# Patient Record
Sex: MALE | Race: WHITE | HISPANIC OR LATINO | Employment: FULL TIME | URBAN - METROPOLITAN AREA
[De-identification: names, ages, dates, MRNs, and addresses within clinical notes are randomized per-mention and may not be internally consistent; named-entity substitution may affect disease eponyms.]

---

## 2022-09-22 ENCOUNTER — OFFICE VISIT (OUTPATIENT)
Dept: SURGERY | Facility: CLINIC | Age: 37
End: 2022-09-22

## 2022-09-22 VITALS — HEIGHT: 67 IN | BODY MASS INDEX: 31.86 KG/M2 | WEIGHT: 203 LBS

## 2022-09-22 DIAGNOSIS — L08.9 CHRONIC WOUND INFECTION OF ABDOMEN, SEQUELA: Primary | ICD-10-CM

## 2022-09-22 DIAGNOSIS — S31.109S CHRONIC WOUND INFECTION OF ABDOMEN, SEQUELA: Primary | ICD-10-CM

## 2022-09-22 PROCEDURE — 99203 OFFICE O/P NEW LOW 30 MIN: CPT | Performed by: SURGERY

## 2022-09-25 NOTE — PROGRESS NOTES
CC: Evaluation for abdominal wall hernia     HPI: Patient is a very pleasant 37-year-old male that is here today for evaluation of abdominal wall hernia.  He has history of multiple abdominal operations in [place] that included an open appendectomy that was complicated by small bowel obstruction that required reexploration 10 days after the initial surgery and then reexploration the day after.  4 months [facility].  He was also found to have drainage from the mid aspect of his incision.  He was placed on antibiotics.  He was treated conservatively and eventually discharge home.  He reports that since surgery 10 years ago he has been having intermittent episode drainage from his wound.  He denies any fevers or chills.  He has been having regular bowel movements.  Reports no abdominal pain     PMH: None     PSH:   -Open appendectomy approximately 1994  -Exploratory laparotomy with small bowel resection 1994 (10 days after his initial operation)  -Reexploration with possible small bowel resection 1 day after the bowel resection.    MEDS: None     ALL: No known drug allergies    FH and SH: Family history is noncontributory.  The patient smokes 1 pack of cigarettes a day.  Has been doing that for several years.  Denies alcohol abuse or taking any drugs     ROS:   Constitutional: denies any weight changes, fatigue or weakness.  HEENT: Denies hearing loss and rhinorrhea  Cardiovascular: denies chest pain, palpitations, edemas.  Respiratory: denies cough, sputum, SOB.  Gastrointestinal: denies N&V, abd pain, diarrhea, constipation.  Genitourinary: denies dysuria, frequency.  Endocrine: denies cold intolerance, lethargy and flushing.  Hem: denies excessive bruising and postop bleeding.  Musculoskeletal: denies weakness, joint swelling, pain or stiffness.  Neuro: denies seizures, CVA, paresthesia, or peripheral neuropathy.   Skin: denies change in nevi, rashes, masses.    "  PE:   Vitals:    09/22/22 0831   Weight: 92.1 kg (203 lb)   Height: 170.2 cm (67\")     Body mass index is 31.79 kg/m².   Alert and oriented ×3, no acute distress.  Head is normocephalic and atraumatic.  Neck is supple there is no thyromegaly or lymphadenopathy  Chest is clear bilaterally there is no added sounds  Regular rate and rhythm, no murmurs  Abdomen is soft and nontender, is nondistended, bowel sounds are positive.  There is no rebound or guarding is and there is no peritoneal signs.  There is paramedian incision with a white scar.  At the center of the wound there is an area of skin thinning and a small scab at the area where he claims has intermittent episodes of drainage.  There is no drainage.  There is no signs of infection.  There is slightly asymmetry with the left side of the abdomen.  There is thinning of the abdominal wall over the right side.  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  It is unclear to me why he got a second surgery after his revision and he is not sure what happened.  Discussed with him about further step.  He will need to stop smoking to try to help with healing of the area of recurrent drainage of his abdominal wall that is likely retained stitch that is probably infected.  At this moment is not infected so we will watch for now and decide what to do whenever drainage starts again.  Encouraged him to lose weight since his BMI 31.7   I will try to get results and images from his prior admission to [facility], unfortunately I was not able to see anything on our system right now.    -Patient verbalized understanding " and agree with the plan    Krishna Allen MD, FACS  General, Minimally Invasive and Endoscopic Surgery  [facility] ([facility]    [address], [address]  [place], [place], [address]  P: [phone]  F: [phone]